# Patient Record
Sex: MALE | Race: WHITE | Employment: OTHER | URBAN - METROPOLITAN AREA
[De-identification: names, ages, dates, MRNs, and addresses within clinical notes are randomized per-mention and may not be internally consistent; named-entity substitution may affect disease eponyms.]

---

## 2023-03-08 DIAGNOSIS — E78.2 MIXED HYPERLIPIDEMIA: Primary | ICD-10-CM

## 2023-03-08 RX ORDER — ATORVASTATIN CALCIUM 80 MG/1
80 TABLET, FILM COATED ORAL DAILY
COMMUNITY
End: 2023-03-09 | Stop reason: SDUPTHER

## 2023-03-08 NOTE — TELEPHONE ENCOUNTER
Received refill request by fax from [facility] ([phone]) for Atorvastatin 80mg 1 tablet daily. No upcoming apt currently. Please schedule before refill can be sent.

## 2023-03-09 RX ORDER — ATORVASTATIN CALCIUM 80 MG/1
80 TABLET, FILM COATED ORAL DAILY
Qty: 90 TABLET | Refills: 0 | Status: SHIPPED | OUTPATIENT
Start: 2023-03-09 | End: 2023-03-30 | Stop reason: SDUPTHER

## 2023-03-15 PROBLEM — M17.0 DEGENERATIVE ARTHRITIS OF KNEE, BILATERAL: Status: ACTIVE | Noted: 2023-03-15

## 2023-03-15 PROBLEM — R73.03 PREDIABETES: Status: ACTIVE | Noted: 2023-03-15

## 2023-03-15 PROBLEM — R41.3 SHORT-TERM MEMORY LOSS: Status: ACTIVE | Noted: 2023-03-15

## 2023-03-15 PROBLEM — L21.9 SEBORRHEIC DERMATITIS: Status: ACTIVE | Noted: 2023-03-15

## 2023-03-15 PROBLEM — E78.5 HYPERLIPIDEMIA: Status: ACTIVE | Noted: 2023-03-15

## 2023-03-15 PROBLEM — N40.0 BPH (BENIGN PROSTATIC HYPERPLASIA): Status: ACTIVE | Noted: 2023-03-15

## 2023-03-15 PROBLEM — I10 BENIGN ESSENTIAL HYPERTENSION: Status: ACTIVE | Noted: 2023-03-15

## 2023-03-15 PROBLEM — H91.90 HEARING LOSS: Status: ACTIVE | Noted: 2023-03-15

## 2023-03-15 PROBLEM — I25.84 CALCIFICATION OF CORONARY ARTERY: Status: ACTIVE | Noted: 2023-03-15

## 2023-03-15 PROBLEM — I49.1 PREMATURE ATRIAL CONTRACTIONS: Status: ACTIVE | Noted: 2023-03-15

## 2023-03-15 PROBLEM — I25.10 CALCIFICATION OF CORONARY ARTERY: Status: ACTIVE | Noted: 2023-03-15

## 2023-03-15 RX ORDER — MULTIVITAMIN
TABLET ORAL
COMMUNITY

## 2023-03-15 RX ORDER — FINASTERIDE 5 MG/1
5 TABLET, FILM COATED ORAL DAILY
COMMUNITY
End: 2023-03-30 | Stop reason: SDUPTHER

## 2023-03-15 RX ORDER — BLOOD-GLUCOSE METER
KIT MISCELLANEOUS
COMMUNITY
Start: 2014-09-17

## 2023-03-15 RX ORDER — LISINOPRIL 10 MG/1
10 TABLET ORAL DAILY
COMMUNITY
End: 2023-03-30 | Stop reason: SDUPTHER

## 2023-03-15 RX ORDER — IBUPROFEN 200 MG
CAPSULE ORAL
COMMUNITY
Start: 2020-02-19

## 2023-03-15 RX ORDER — BLOOD-GLUCOSE METER
EACH MISCELLANEOUS
COMMUNITY
Start: 2019-08-09

## 2023-03-15 RX ORDER — ASPIRIN 81 MG/1
81 TABLET ORAL
COMMUNITY
End: 2023-03-30 | Stop reason: ALTCHOICE

## 2023-03-30 ENCOUNTER — LAB (OUTPATIENT)
Dept: LAB | Facility: LAB | Age: 83
End: 2023-03-30
Payer: MEDICARE

## 2023-03-30 ENCOUNTER — OFFICE VISIT (OUTPATIENT)
Dept: PRIMARY CARE | Facility: CLINIC | Age: 83
End: 2023-03-30
Payer: MEDICARE

## 2023-03-30 VITALS
RESPIRATION RATE: 14 BRPM | OXYGEN SATURATION: 95 % | HEIGHT: 68 IN | DIASTOLIC BLOOD PRESSURE: 69 MMHG | TEMPERATURE: 98.1 F | BODY MASS INDEX: 22.9 KG/M2 | HEART RATE: 60 BPM | WEIGHT: 151.1 LBS | SYSTOLIC BLOOD PRESSURE: 136 MMHG

## 2023-03-30 DIAGNOSIS — R73.03 PREDIABETES: ICD-10-CM

## 2023-03-30 DIAGNOSIS — I10 BENIGN ESSENTIAL HYPERTENSION: ICD-10-CM

## 2023-03-30 DIAGNOSIS — E78.2 MIXED HYPERLIPIDEMIA: ICD-10-CM

## 2023-03-30 DIAGNOSIS — I25.84 CALCIFICATION OF CORONARY ARTERY: ICD-10-CM

## 2023-03-30 DIAGNOSIS — Z00.00 ROUTINE GENERAL MEDICAL EXAMINATION AT HEALTH CARE FACILITY: Primary | ICD-10-CM

## 2023-03-30 DIAGNOSIS — Z13.89 SCREENING FOR MULTIPLE CONDITIONS: ICD-10-CM

## 2023-03-30 DIAGNOSIS — N40.0 BENIGN PROSTATIC HYPERPLASIA WITHOUT LOWER URINARY TRACT SYMPTOMS: ICD-10-CM

## 2023-03-30 DIAGNOSIS — I25.10 CALCIFICATION OF CORONARY ARTERY: ICD-10-CM

## 2023-03-30 PROBLEM — H35.3132 INTERMEDIATE STAGE NONEXUDATIVE AGE-RELATED MACULAR DEGENERATION OF BOTH EYES: Status: ACTIVE | Noted: 2023-03-30

## 2023-03-30 PROBLEM — H35.30 MACULAR DEGENERATION: Status: ACTIVE | Noted: 2023-03-30

## 2023-03-30 LAB
ALANINE AMINOTRANSFERASE (SGPT) (U/L) IN SER/PLAS: 20 U/L (ref 10–52)
ALBUMIN (G/DL) IN SER/PLAS: 4.4 G/DL (ref 3.4–5)
ALKALINE PHOSPHATASE (U/L) IN SER/PLAS: 87 U/L (ref 33–136)
ANION GAP IN SER/PLAS: 11 MMOL/L (ref 10–20)
ASPARTATE AMINOTRANSFERASE (SGOT) (U/L) IN SER/PLAS: 31 U/L (ref 9–39)
BASOPHILS (10*3/UL) IN BLOOD BY AUTOMATED COUNT: 0.03 X10E9/L (ref 0–0.1)
BASOPHILS/100 LEUKOCYTES IN BLOOD BY AUTOMATED COUNT: 0.5 % (ref 0–2)
BILIRUBIN TOTAL (MG/DL) IN SER/PLAS: 1.6 MG/DL (ref 0–1.2)
CALCIUM (MG/DL) IN SER/PLAS: 9.9 MG/DL (ref 8.6–10.6)
CARBON DIOXIDE, TOTAL (MMOL/L) IN SER/PLAS: 30 MMOL/L (ref 21–32)
CHLORIDE (MMOL/L) IN SER/PLAS: 103 MMOL/L (ref 98–107)
CHOLESTEROL (MG/DL) IN SER/PLAS: 127 MG/DL (ref 0–199)
CHOLESTEROL IN HDL (MG/DL) IN SER/PLAS: 60.6 MG/DL
CHOLESTEROL/HDL RATIO: 2.1
CREATININE (MG/DL) IN SER/PLAS: 0.84 MG/DL (ref 0.5–1.3)
EOSINOPHILS (10*3/UL) IN BLOOD BY AUTOMATED COUNT: 0.24 X10E9/L (ref 0–0.4)
EOSINOPHILS/100 LEUKOCYTES IN BLOOD BY AUTOMATED COUNT: 4 % (ref 0–6)
ERYTHROCYTE DISTRIBUTION WIDTH (RATIO) BY AUTOMATED COUNT: 12.7 % (ref 11.5–14.5)
ERYTHROCYTE MEAN CORPUSCULAR HEMOGLOBIN CONCENTRATION (G/DL) BY AUTOMATED: 32 G/DL (ref 32–36)
ERYTHROCYTE MEAN CORPUSCULAR VOLUME (FL) BY AUTOMATED COUNT: 96 FL (ref 80–100)
ERYTHROCYTES (10*6/UL) IN BLOOD BY AUTOMATED COUNT: 4.97 X10E12/L (ref 4.5–5.9)
ESTIMATED AVERAGE GLUCOSE FOR HBA1C: 126 MG/DL
GFR MALE: 86 ML/MIN/1.73M2
GLUCOSE (MG/DL) IN SER/PLAS: 120 MG/DL (ref 74–99)
HEMATOCRIT (%) IN BLOOD BY AUTOMATED COUNT: 47.8 % (ref 41–52)
HEMOGLOBIN (G/DL) IN BLOOD: 15.3 G/DL (ref 13.5–17.5)
HEMOGLOBIN A1C/HEMOGLOBIN TOTAL IN BLOOD: 6 %
IMMATURE GRANULOCYTES/100 LEUKOCYTES IN BLOOD BY AUTOMATED COUNT: 0.2 % (ref 0–0.9)
LDL: 56 MG/DL (ref 0–99)
LEUKOCYTES (10*3/UL) IN BLOOD BY AUTOMATED COUNT: 6 X10E9/L (ref 4.4–11.3)
LYMPHOCYTES (10*3/UL) IN BLOOD BY AUTOMATED COUNT: 1.46 X10E9/L (ref 0.8–3)
LYMPHOCYTES/100 LEUKOCYTES IN BLOOD BY AUTOMATED COUNT: 24.5 % (ref 13–44)
MONOCYTES (10*3/UL) IN BLOOD BY AUTOMATED COUNT: 0.6 X10E9/L (ref 0.05–0.8)
MONOCYTES/100 LEUKOCYTES IN BLOOD BY AUTOMATED COUNT: 10.1 % (ref 2–10)
NEUTROPHILS (10*3/UL) IN BLOOD BY AUTOMATED COUNT: 3.61 X10E9/L (ref 1.6–5.5)
NEUTROPHILS/100 LEUKOCYTES IN BLOOD BY AUTOMATED COUNT: 60.7 % (ref 40–80)
NRBC (PER 100 WBCS) BY AUTOMATED COUNT: 0 /100 WBC (ref 0–0)
PLATELETS (10*3/UL) IN BLOOD AUTOMATED COUNT: 196 X10E9/L (ref 150–450)
POTASSIUM (MMOL/L) IN SER/PLAS: 4.3 MMOL/L (ref 3.5–5.3)
PROTEIN TOTAL: 6.7 G/DL (ref 6.4–8.2)
SODIUM (MMOL/L) IN SER/PLAS: 140 MMOL/L (ref 136–145)
TRIGLYCERIDE (MG/DL) IN SER/PLAS: 50 MG/DL (ref 0–149)
UREA NITROGEN (MG/DL) IN SER/PLAS: 15 MG/DL (ref 6–23)
VLDL: 10 MG/DL (ref 0–40)

## 2023-03-30 PROCEDURE — 80053 COMPREHEN METABOLIC PANEL: CPT

## 2023-03-30 PROCEDURE — 36415 COLL VENOUS BLD VENIPUNCTURE: CPT

## 2023-03-30 PROCEDURE — 85025 COMPLETE CBC W/AUTO DIFF WBC: CPT

## 2023-03-30 PROCEDURE — 80061 LIPID PANEL: CPT

## 2023-03-30 PROCEDURE — G0444 DEPRESSION SCREEN ANNUAL: HCPCS | Performed by: FAMILY MEDICINE

## 2023-03-30 PROCEDURE — 83036 HEMOGLOBIN GLYCOSYLATED A1C: CPT

## 2023-03-30 PROCEDURE — 3075F SYST BP GE 130 - 139MM HG: CPT | Performed by: FAMILY MEDICINE

## 2023-03-30 PROCEDURE — 1160F RVW MEDS BY RX/DR IN RCRD: CPT | Performed by: FAMILY MEDICINE

## 2023-03-30 PROCEDURE — 3078F DIAST BP <80 MM HG: CPT | Performed by: FAMILY MEDICINE

## 2023-03-30 PROCEDURE — G0439 PPPS, SUBSEQ VISIT: HCPCS | Performed by: FAMILY MEDICINE

## 2023-03-30 PROCEDURE — 1159F MED LIST DOCD IN RCRD: CPT | Performed by: FAMILY MEDICINE

## 2023-03-30 PROCEDURE — 1170F FXNL STATUS ASSESSED: CPT | Performed by: FAMILY MEDICINE

## 2023-03-30 PROCEDURE — 1036F TOBACCO NON-USER: CPT | Performed by: FAMILY MEDICINE

## 2023-03-30 RX ORDER — LISINOPRIL 10 MG/1
10 TABLET ORAL DAILY
Qty: 90 TABLET | Refills: 3 | Status: SHIPPED | OUTPATIENT
Start: 2023-03-30 | End: 2024-05-02

## 2023-03-30 RX ORDER — FINASTERIDE 5 MG/1
5 TABLET, FILM COATED ORAL DAILY
Qty: 90 TABLET | Refills: 3 | Status: SHIPPED | OUTPATIENT
Start: 2023-03-30 | End: 2024-05-02

## 2023-03-30 RX ORDER — ATORVASTATIN CALCIUM 80 MG/1
80 TABLET, FILM COATED ORAL DAILY
Qty: 90 TABLET | Refills: 3 | Status: SHIPPED | OUTPATIENT
Start: 2023-03-30 | End: 2024-04-09 | Stop reason: SDUPTHER

## 2023-03-30 ASSESSMENT — ACTIVITIES OF DAILY LIVING (ADL)
DOING_HOUSEWORK: INDEPENDENT
TAKING_MEDICATION: INDEPENDENT
GROCERY_SHOPPING: INDEPENDENT
MANAGING_FINANCES: INDEPENDENT
BATHING: INDEPENDENT
DRESSING: INDEPENDENT

## 2023-03-30 ASSESSMENT — PATIENT HEALTH QUESTIONNAIRE - PHQ9
2. FEELING DOWN, DEPRESSED OR HOPELESS: NOT AT ALL
SUM OF ALL RESPONSES TO PHQ9 QUESTIONS 1 AND 2: 0
1. LITTLE INTEREST OR PLEASURE IN DOING THINGS: NOT AT ALL

## 2023-03-30 NOTE — ASSESSMENT & PLAN NOTE
Follows with Dr. Sinha. Considering stress test in the near future.  Continue management of cholesterol.

## 2023-03-30 NOTE — PROGRESS NOTES
Subjective   Reason for Visit: Austyn Allen is an 82 y.o. male here for a Medicare Wellness visit.      High blood pressure evaluation.     Review of symptoms:  Fatigue:  N   Headaches: N  Blurred vision :  N  Palpitations: N  Chest pains: N   Shortness of Breath: N  Swelling of legs: N  Blood in urine: N   Taking medications daily: Y  Medication side effects: N  Problems with medication compliance:N  Baby Aspirin 81 mg daily: Y     High cholesterol evaluation.     Supplements: Y   specific diet plan: N  exercising 30 min daily?: N     Fatigue:N  Chest pains:N  Shortness of Breath:N  Sudden Headaches: N  Vision loss: N   Syncope (fainting): N  Leg Claudication (limping/leg tiredness):  Taking medication daily: Y   Medication side effects:N        Reviewed all medications by prescribing practitioner or clinical pharmacist (such as prescriptions, OTCs, herbal therapies and supplements) and documented in the medical record.    HPI    Patient Care Team:  Chavo Contreras MD as PCP - General  Chavo Contreras MD as PCP - [employer] PCP     Review of Systems    Objective   Vitals:  There were no vitals taken for this visit.      Physical Exam    Assessment/Plan   Problem List Items Addressed This Visit          Other    Hyperlipidemia

## 2023-03-30 NOTE — PROGRESS NOTES
"Subjective   Reason for Visit: Austyn Allen is an 82 y.o. male here for a Medicare Wellness visit.          Reviewed all medications by prescribing practitioner or clinical pharmacist (such as prescriptions, OTCs, herbal therapies and supplements) and documented in the medical record.    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 He has not started any treatments.    Patient recently had cataract surgery with Dr. Pacheco. Reports significant improvement in vision s/p surgery. He still wears reading glasses.    BPH: Controlled well with finasteride. Wakes up 0-2 times per night to urinate. Follows with Dr. Wilkes urologist.    Patient follows with cardiology Dr. Sinha. He continues to monitor cholesterol. No current sign of  significant coronary artery blockage. Patient walks on treadmill regularly and tolerates it well.     Patient c/o difficulty hearing. He states he can hear sounds well but has difficulty understanding people's voices. He wears hearing aids with different settings.     Patient Care Team:  Chavo Contreras MD as PCP - General  Chavo Contreras MD as PCP - [employer] PCP     Review of Systems  constitutional: as per HPI  eyes:as per HPI  CV:as per HPI  Respiratory:as per HPI  GI:as per HPI  neuro:as per HPI  endo:as per HPI  heme/lymph:as per HPI     Objective   Vitals:  /69 (BP Location: Right arm, Patient Position: Sitting, BP Cuff Size: Adult)   Pulse 60   Temp 36.7 °C (98.1 °F) (Temporal)   Resp 14   Ht 1.727 m (5' 8\")   Wt 68.5 kg (151 lb 1.6 oz)   SpO2 95%   BMI 22.97 kg/m²       Physical Exam  Constitutional:       Appearance: Normal appearance. He is normal weight.   HENT:      Head: Normocephalic.      Right Ear: Tympanic membrane, ear canal and external ear normal.      Left Ear: Tympanic membrane, ear canal and external ear normal.      Nose: Nose normal.      Mouth/Throat:      Mouth: Mucous membranes are moist.   Eyes:      Conjunctiva/sclera: " Conjunctivae normal.      Pupils: Pupils are equal, round, and reactive to light.   Cardiovascular:      Rate and Rhythm: Normal rate and regular rhythm.   Pulmonary:      Effort: Pulmonary effort is normal.      Breath sounds: Normal breath sounds.   Abdominal:      General: Abdomen is flat. Bowel sounds are normal.      Palpations: Abdomen is soft.   Musculoskeletal:         General: Normal range of motion.      Cervical back: Neck supple.   Skin:     General: Skin is warm.   Neurological:      General: No focal deficit present.      Mental Status: He is alert and oriented to person, place, and time.   Psychiatric:         Mood and Affect: Mood normal.         Behavior: Behavior normal.         Thought Content: Thought content normal.         Judgment: Judgment normal.         Assessment/Plan   Problem List Items Addressed This Visit          Circulatory    Benign essential hypertension    Current Assessment & Plan     Stable  Blood pressure in office today was   BP Readings from Last 1 Encounters:   03/30/23 136/69   The goal range for blood pressure is below 130/80.   We will continue to monitor.   Continue lisinopril 10 mg daily         Relevant Medications    lisinopril 10 mg tablet    Other Relevant Orders    Comprehensive Metabolic Panel    CBC and Auto Differential    Calcification of coronary artery    Current Assessment & Plan     Follows with Dr. Sinha. Considering stress test in the near future.  Continue management of cholesterol.            Genitourinary    BPH (benign prostatic hyperplasia)    Overview     Monitored urology Dr Wilkes         Current Assessment & Plan     Saw Dr Wilkes 12/06/2022  Finasteride prescribed         Relevant Medications    finasteride (Proscar) 5 mg tablet       Endocrine/Metabolic    Prediabetes    Current Assessment & Plan     Ordered A1c.  Continue management with lifestyle measures.         Relevant Orders    Hemoglobin A1c    CBC and Auto Differential       Other     Mixed hyperlipidemia    Current Assessment & Plan     Stable.  Continue atorvastatin 80 mg daily.  Ordered lipid panel.         Relevant Medications    atorvastatin (Lipitor) 80 mg tablet    Other Relevant Orders    Lipid Panel     Other Visit Diagnoses       Routine general medical examination at health care facility    -  Primary    Relevant Orders    1 Year Follow Up In [facility] - PCP - Wellness Exam    Screening for multiple conditions                       By signing my name below SHY, jeff Hines, attest that this documentation has been prepared under the direction and in the presence of Dr. Chavo Contreras. 03/30/23

## 2023-03-30 NOTE — ASSESSMENT & PLAN NOTE
Stable  Blood pressure in office today was   BP Readings from Last 1 Encounters:   03/30/23 136/69     The goal range for blood pressure is below 130/80.   We will continue to monitor.   Continue lisinopril 10 mg daily

## 2023-04-04 DIAGNOSIS — E78.2 MIXED HYPERLIPIDEMIA: ICD-10-CM

## 2023-04-05 RX ORDER — ATORVASTATIN CALCIUM 80 MG/1
TABLET, FILM COATED ORAL
Qty: 90 TABLET | Refills: 3 | OUTPATIENT
Start: 2023-04-05

## 2023-06-07 ENCOUNTER — OFFICE VISIT (OUTPATIENT)
Dept: PRIMARY CARE | Facility: CLINIC | Age: 83
End: 2023-06-07
Payer: MEDICARE

## 2023-06-07 ENCOUNTER — TELEPHONE (OUTPATIENT)
Dept: PRIMARY CARE | Facility: CLINIC | Age: 83
End: 2023-06-07

## 2023-06-07 VITALS
TEMPERATURE: 98.6 F | OXYGEN SATURATION: 96 % | BODY MASS INDEX: 22.14 KG/M2 | WEIGHT: 145.6 LBS | DIASTOLIC BLOOD PRESSURE: 58 MMHG | SYSTOLIC BLOOD PRESSURE: 137 MMHG | HEART RATE: 94 BPM

## 2023-06-07 DIAGNOSIS — L03.114 CELLULITIS OF LEFT UPPER EXTREMITY: Primary | ICD-10-CM

## 2023-06-07 PROCEDURE — 3078F DIAST BP <80 MM HG: CPT | Performed by: FAMILY MEDICINE

## 2023-06-07 PROCEDURE — 1036F TOBACCO NON-USER: CPT | Performed by: FAMILY MEDICINE

## 2023-06-07 PROCEDURE — 3075F SYST BP GE 130 - 139MM HG: CPT | Performed by: FAMILY MEDICINE

## 2023-06-07 PROCEDURE — 1159F MED LIST DOCD IN RCRD: CPT | Performed by: FAMILY MEDICINE

## 2023-06-07 PROCEDURE — 99214 OFFICE O/P EST MOD 30 MIN: CPT | Performed by: FAMILY MEDICINE

## 2023-06-07 PROCEDURE — 1160F RVW MEDS BY RX/DR IN RCRD: CPT | Performed by: FAMILY MEDICINE

## 2023-06-07 RX ORDER — ACETAMINOPHEN 325 MG/1
TABLET ORAL EVERY 6 HOURS PRN
COMMUNITY

## 2023-06-07 RX ORDER — AMOXICILLIN AND CLAVULANATE POTASSIUM 875; 125 MG/1; MG/1
875 TABLET, FILM COATED ORAL 2 TIMES DAILY
Qty: 20 TABLET | Refills: 0 | Status: SHIPPED | OUTPATIENT
Start: 2023-06-07 | End: 2023-06-17

## 2023-06-07 NOTE — PROGRESS NOTES
" Subjective   Patient ID: Austyn Allen is a 82 y.o. male who presents for Hand Pain (Left hand/lower arm pain and redness started 3 days ago. Some of the pain has subsided over the last 24 hours, but the redness remains. ).  HPI    PCP - Dr. Contreras . Pt and spouse are [employer]     Here w spouse.   Pt reports redness and swelling in left hand fingers.    No fever, chills, injury,   No other illness  .     Not on blood thinners .  No recent other infx.    Took a dose of tylenol last night.  Today the redness is less \"bright \"  . The swellingon the top of his left hand is less.   Today is easier to make a fist.     Activity - skipped treadmill yesterday and today.   Mowed grass yesterday .    Review of Systems  TD  6 yrs ago   Objective   /58 (BP Location: Right arm, Patient Position: Sitting, BP Cuff Size: Adult)   Pulse 94   Temp 37 °C (98.6 °F) (Temporal)   Wt 66 kg (145 lb 9.6 oz)   SpO2 96%   BMI 22.14 kg/m²     Physical Exam  Alert .  Thin.  No acute distress.    Extr:  neurovasc intact bilat ue .     Erythema,  swelling, warmth from fingers ( PIP )  and proximally just below antecubital fossa   . Not tense. No axillary lymph nodes.  No supra or infraclav ln . Normal rom of elbow. Limited rom of wrist , thumb due to swelling.   Has dorsal scab ,base of the 5th metacarpal.  - states was a cat scratch     Has multiple linear light pink  marking  on left  upper chest and anterior shoulde.r not painful. No scabs, blisters,  or apparent open areas.    Assessment/Plan   Problem List Items Addressed This Visit    None  Visit Diagnoses       Cellulitis of left upper extremity    -  Primary    Relevant Medications    amoxicillin-pot clavulanate (Augmentin) 875-125 mg tablet             Cellulitis, cat scratch .   Oral antibx.  Elevate, rest .       Skin markings , upper chest- unclear etiology - he thinks he may have rubbed it on a few tree branches. No pain . I do not think this is related/ part of his " cellulitis.   CAITLYN Romo MD

## 2023-06-07 NOTE — TELEPHONE ENCOUNTER
Patient sent this message on spouses [employer] account:    Dr Contreras,  This is León Allen.  I have an issue now.  It is hard to reach you any other way     I have something like a compression injury on the top of my hand.  This has been the case for several days.  Can’ t reach you any other way.  The surface I is reddish pink and quite painful. This is moving up the inside of my arm.  Should you look at this?  Please respond  [phone].  Thanks,  León

## 2023-06-07 NOTE — TELEPHONE ENCOUNTER
Call pt ,  I can see him .  Use a spot today , that I gave you .   I can see him tomorrow or Friday, let me know if he can't come today .  Ill give you some time/ slot.

## 2024-04-03 ENCOUNTER — APPOINTMENT (OUTPATIENT)
Dept: PRIMARY CARE | Facility: CLINIC | Age: 84
End: 2024-04-03
Payer: MEDICARE

## 2024-04-09 ENCOUNTER — OFFICE VISIT (OUTPATIENT)
Dept: PRIMARY CARE | Facility: CLINIC | Age: 84
End: 2024-04-09
Payer: MEDICARE

## 2024-04-09 ENCOUNTER — LAB (OUTPATIENT)
Dept: LAB | Facility: LAB | Age: 84
End: 2024-04-09
Payer: MEDICARE

## 2024-04-09 VITALS
BODY MASS INDEX: 23.31 KG/M2 | WEIGHT: 153.3 LBS | DIASTOLIC BLOOD PRESSURE: 68 MMHG | HEART RATE: 59 BPM | OXYGEN SATURATION: 97 % | RESPIRATION RATE: 16 BRPM | TEMPERATURE: 96.8 F | SYSTOLIC BLOOD PRESSURE: 155 MMHG

## 2024-04-09 DIAGNOSIS — I10 BENIGN ESSENTIAL HYPERTENSION: ICD-10-CM

## 2024-04-09 DIAGNOSIS — R73.03 PREDIABETES: ICD-10-CM

## 2024-04-09 DIAGNOSIS — I25.10 CALCIFICATION OF CORONARY ARTERY: ICD-10-CM

## 2024-04-09 DIAGNOSIS — Z13.6 SCREENING FOR CARDIOVASCULAR CONDITION: ICD-10-CM

## 2024-04-09 DIAGNOSIS — E78.2 MIXED HYPERLIPIDEMIA: ICD-10-CM

## 2024-04-09 DIAGNOSIS — I25.84 CALCIFICATION OF CORONARY ARTERY: ICD-10-CM

## 2024-04-09 DIAGNOSIS — Z13.1 DIABETES MELLITUS SCREENING: ICD-10-CM

## 2024-04-09 DIAGNOSIS — Z00.00 ROUTINE GENERAL MEDICAL EXAMINATION AT HEALTH CARE FACILITY: ICD-10-CM

## 2024-04-09 DIAGNOSIS — Z13.89 SCREENING FOR MULTIPLE CONDITIONS: ICD-10-CM

## 2024-04-09 DIAGNOSIS — H91.93 BILATERAL HEARING LOSS, UNSPECIFIED HEARING LOSS TYPE: ICD-10-CM

## 2024-04-09 DIAGNOSIS — N40.1 BENIGN PROSTATIC HYPERPLASIA WITH LOWER URINARY TRACT SYMPTOMS, SYMPTOM DETAILS UNSPECIFIED: Primary | ICD-10-CM

## 2024-04-09 PROCEDURE — 1159F MED LIST DOCD IN RCRD: CPT | Performed by: FAMILY MEDICINE

## 2024-04-09 PROCEDURE — 99214 OFFICE O/P EST MOD 30 MIN: CPT | Performed by: FAMILY MEDICINE

## 2024-04-09 PROCEDURE — G0439 PPPS, SUBSEQ VISIT: HCPCS | Performed by: FAMILY MEDICINE

## 2024-04-09 PROCEDURE — 36415 COLL VENOUS BLD VENIPUNCTURE: CPT

## 2024-04-09 PROCEDURE — 80061 LIPID PANEL: CPT

## 2024-04-09 PROCEDURE — 3078F DIAST BP <80 MM HG: CPT | Performed by: FAMILY MEDICINE

## 2024-04-09 PROCEDURE — G0444 DEPRESSION SCREEN ANNUAL: HCPCS | Performed by: FAMILY MEDICINE

## 2024-04-09 PROCEDURE — 85025 COMPLETE CBC W/AUTO DIFF WBC: CPT

## 2024-04-09 PROCEDURE — 1160F RVW MEDS BY RX/DR IN RCRD: CPT | Performed by: FAMILY MEDICINE

## 2024-04-09 PROCEDURE — 1036F TOBACCO NON-USER: CPT | Performed by: FAMILY MEDICINE

## 2024-04-09 PROCEDURE — 1170F FXNL STATUS ASSESSED: CPT | Performed by: FAMILY MEDICINE

## 2024-04-09 PROCEDURE — 83036 HEMOGLOBIN GLYCOSYLATED A1C: CPT

## 2024-04-09 PROCEDURE — 3077F SYST BP >= 140 MM HG: CPT | Performed by: FAMILY MEDICINE

## 2024-04-09 PROCEDURE — 80053 COMPREHEN METABOLIC PANEL: CPT

## 2024-04-09 RX ORDER — ATORVASTATIN CALCIUM 40 MG/1
40 TABLET, FILM COATED ORAL DAILY
Qty: 90 TABLET | Refills: 3 | Status: SHIPPED | OUTPATIENT
Start: 2024-04-09

## 2024-04-09 ASSESSMENT — ACTIVITIES OF DAILY LIVING (ADL)
TAKING_MEDICATION: INDEPENDENT
DRESSING: INDEPENDENT
MANAGING_FINANCES: INDEPENDENT
BATHING: INDEPENDENT
DOING_HOUSEWORK: INDEPENDENT
GROCERY_SHOPPING: INDEPENDENT

## 2024-04-09 ASSESSMENT — ENCOUNTER SYMPTOMS
NAUSEA: 0
DIFFICULTY URINATING: 0
WHEEZING: 0
FREQUENCY: 0
PALPITATIONS: 0
DIARRHEA: 0
SHORTNESS OF BREATH: 0
CONSTIPATION: 0

## 2024-04-09 ASSESSMENT — PATIENT HEALTH QUESTIONNAIRE - PHQ9
SUM OF ALL RESPONSES TO PHQ9 QUESTIONS 1 AND 2: 0
2. FEELING DOWN, DEPRESSED OR HOPELESS: NOT AT ALL
1. LITTLE INTEREST OR PLEASURE IN DOING THINGS: NOT AT ALL

## 2024-04-09 NOTE — ASSESSMENT & PLAN NOTE
Blood pressure in office today was   BP Readings from Last 1 Encounters:   04/09/24 155/68   The goal range for blood pressure is below 130/80.   We will continue to monitor.   Continue lisinopril 10 mg daily

## 2024-04-09 NOTE — PROGRESS NOTES
Subjective   Reason for Visit: Austyn Allen is an 83 y.o. male here for a Medicare Wellness visit.               HPI    Patient Care Team:  Chavo Contreras MD as PCP - General  Chavo Contreras MD as PCP - [employer] PCP     Review of Systems    Objective   Vitals:  There were no vitals taken for this visit.      Physical Exam    Assessment/Plan   Problem List Items Addressed This Visit       Benign essential hypertension    BPH (benign prostatic hyperplasia) - Primary    Overview     Monitored urology Dr Wilkes         Calcification of coronary artery    Hearing loss    Overview     Has hearing aids         Mixed hyperlipidemia    Prediabetes

## 2024-04-09 NOTE — ASSESSMENT & PLAN NOTE
Lab Results   Component Value Date    HGBA1C 6.0 (A) 03/30/2023   Ordered A1c.  Continue management with lifestyle measures.

## 2024-04-09 NOTE — PROGRESS NOTES
Subjective   Reason for Visit: Austyn Allen is an 83 y.o. male here for a Medicare Wellness visit.       Reviewed all medications by prescribing practitioner or clinical pharmacist (such as prescriptions, OTCs, herbal therapies and supplements) and documented in the medical record.    He is only taking 40 mg of atorvastatin and he states that the pharmacy keeps filling for 80 mg. He does not want to have to cut the pills every day and would like a new prescription sent.   At home blood pressure readings seem to be stable, he states they are always around the goal of 130/80.   He is active and prefers to jog for exercise, mainly on a treadmill. He does not have any breathing symptoms associated with activity. His goal is to jog at least 3 miles per day.  He plans to see an eye doctor soon, it has been a few years. He does have a history of having cataracts removed.         Patient Care Team:  Chavo Contreras MD as PCP - General  Chavo Contreras MD as PCP - [employer] PCP     Review of Systems   Respiratory:  Negative for shortness of breath and wheezing.    Cardiovascular:  Negative for chest pain and palpitations.   Gastrointestinal:  Negative for constipation, diarrhea and nausea.   Genitourinary:  Negative for difficulty urinating, frequency and urgency.       Objective   Vitals:  /68 (BP Location: Left arm, Patient Position: Sitting, BP Cuff Size: Adult)   Pulse 59   Temp 36 °C (96.8 °F) (Temporal)   Resp 16   Wt 69.5 kg (153 lb 4.8 oz)   SpO2 97%   BMI 23.31 kg/m²       Physical Exam  Constitutional:       Appearance: Normal appearance.   HENT:      Head: Normocephalic.      Right Ear: Tympanic membrane normal.      Left Ear: Tympanic membrane normal.      Mouth/Throat:      Mouth: Mucous membranes are moist.      Pharynx: Oropharynx is clear.   Eyes:      Pupils: Pupils are equal, round, and reactive to light.   Cardiovascular:      Rate and Rhythm: Normal rate and regular rhythm.       Pulses: Normal pulses.      Heart sounds: Normal heart sounds. No murmur heard.  Pulmonary:      Effort: Pulmonary effort is normal.      Breath sounds: Normal breath sounds.   Abdominal:      General: Abdomen is flat. Bowel sounds are normal.      Palpations: Abdomen is soft.   Skin:     General: Skin is warm and dry.   Neurological:      General: No focal deficit present.      Mental Status: He is alert.   Psychiatric:         Mood and Affect: Mood normal.         Behavior: Behavior normal.         Thought Content: Thought content normal.         Judgment: Judgment normal.         Assessment/Plan   Problem List Items Addressed This Visit       Benign essential hypertension    Current Assessment & Plan     Blood pressure in office today was   BP Readings from Last 1 Encounters:   04/09/24 155/68   The goal range for blood pressure is below 130/80.   We will continue to monitor.   Continue lisinopril 10 mg daily         BPH (benign prostatic hyperplasia) - Primary    Overview     Monitored urology Dr Wilkes         Calcification of coronary artery    Overview     CAC 2021        LM 0,  .5,  LCx 37.37,  .33,     Total 840.28         Current Assessment & Plan     Follows with Dr. Sinha. Considering stress test in the near future.  Continue management of cholesterol.         Relevant Orders    CBC and Auto Differential    Hearing loss    Overview     Has hearing aids         Current Assessment & Plan     Bilateral hearing aids.         Mixed hyperlipidemia    Current Assessment & Plan     Lab Results   Component Value Date    CHOL 127 03/30/2023    CHOL 132 03/23/2022    CHOL 132 02/10/2021     Lab Results   Component Value Date    HDL 60.6 03/30/2023    HDL 53.9 03/23/2022    HDL 51.1 02/10/2021     Lab Results   Component Value Date    LDLF 56 03/30/2023     Lab Results   Component Value Date    TRIG 50 03/30/2023    TRIG 52 03/23/2022    TRIG 57 02/10/2021   Stable.  Patient only taking atorvastatin 40  mg daily, Rx is for 80 mg daily.  New Rx sent to reflect 40 mg dose.  Repeat lipid panel ordered.         Relevant Medications    atorvastatin (Lipitor) 40 mg tablet    Prediabetes    Current Assessment & Plan     Lab Results   Component Value Date    HGBA1C 6.0 (A) 03/30/2023   Ordered A1c.  Continue management with lifestyle measures.         Relevant Orders    Hemoglobin A1C    CBC and Auto Differential     Other Visit Diagnoses       Screening for multiple conditions        Diabetes mellitus screening        Relevant Orders    Comprehensive Metabolic Panel    Screening for cardiovascular condition        Relevant Orders    Lipid panel    Routine general medical examination at health care facility              Scribe Attestation  By signing my name below, IMelba Scribe   attest that this documentation has been prepared under the direction and in the presence of Chavo Contreras MD.

## 2024-04-09 NOTE — ASSESSMENT & PLAN NOTE
Lab Results   Component Value Date    CHOL 127 03/30/2023    CHOL 132 03/23/2022    CHOL 132 02/10/2021     Lab Results   Component Value Date    HDL 60.6 03/30/2023    HDL 53.9 03/23/2022    HDL 51.1 02/10/2021     Lab Results   Component Value Date    LDLF 56 03/30/2023     Lab Results   Component Value Date    TRIG 50 03/30/2023    TRIG 52 03/23/2022    TRIG 57 02/10/2021   Stable.  Patient only taking atorvastatin 40 mg daily, Rx is for 80 mg daily.  New Rx sent to reflect 40 mg dose.  Repeat lipid panel ordered.

## 2024-04-10 LAB
ALBUMIN SERPL BCP-MCNC: 4.2 G/DL (ref 3.4–5)
ALP SERPL-CCNC: 78 U/L (ref 33–136)
ALT SERPL W P-5'-P-CCNC: 19 U/L (ref 10–52)
ANION GAP SERPL CALC-SCNC: 10 MMOL/L (ref 10–20)
AST SERPL W P-5'-P-CCNC: 29 U/L (ref 9–39)
BASOPHILS # BLD AUTO: 0.02 X10*3/UL (ref 0–0.1)
BASOPHILS NFR BLD AUTO: 0.4 %
BILIRUB SERPL-MCNC: 1 MG/DL (ref 0–1.2)
BUN SERPL-MCNC: 19 MG/DL (ref 6–23)
CALCIUM SERPL-MCNC: 9.9 MG/DL (ref 8.6–10.6)
CHLORIDE SERPL-SCNC: 103 MMOL/L (ref 98–107)
CHOLEST SERPL-MCNC: 133 MG/DL (ref 0–199)
CHOLESTEROL/HDL RATIO: 2.1
CO2 SERPL-SCNC: 31 MMOL/L (ref 21–32)
CREAT SERPL-MCNC: 0.93 MG/DL (ref 0.5–1.3)
EGFRCR SERPLBLD CKD-EPI 2021: 81 ML/MIN/1.73M*2
EOSINOPHIL # BLD AUTO: 0.22 X10*3/UL (ref 0–0.4)
EOSINOPHIL NFR BLD AUTO: 4.1 %
ERYTHROCYTE [DISTWIDTH] IN BLOOD BY AUTOMATED COUNT: 12.8 % (ref 11.5–14.5)
EST. AVERAGE GLUCOSE BLD GHB EST-MCNC: 131 MG/DL
GLUCOSE SERPL-MCNC: 111 MG/DL (ref 74–99)
HBA1C MFR BLD: 6.2 %
HCT VFR BLD AUTO: 44.4 % (ref 41–52)
HDLC SERPL-MCNC: 62.7 MG/DL
HGB BLD-MCNC: 14.6 G/DL (ref 13.5–17.5)
IMM GRANULOCYTES # BLD AUTO: 0.01 X10*3/UL (ref 0–0.5)
IMM GRANULOCYTES NFR BLD AUTO: 0.2 % (ref 0–0.9)
LDLC SERPL CALC-MCNC: 61 MG/DL
LYMPHOCYTES # BLD AUTO: 1.41 X10*3/UL (ref 0.8–3)
LYMPHOCYTES NFR BLD AUTO: 26.4 %
MCH RBC QN AUTO: 30.4 PG (ref 26–34)
MCHC RBC AUTO-ENTMCNC: 32.9 G/DL (ref 32–36)
MCV RBC AUTO: 93 FL (ref 80–100)
MONOCYTES # BLD AUTO: 0.53 X10*3/UL (ref 0.05–0.8)
MONOCYTES NFR BLD AUTO: 9.9 %
NEUTROPHILS # BLD AUTO: 3.15 X10*3/UL (ref 1.6–5.5)
NEUTROPHILS NFR BLD AUTO: 59 %
NON HDL CHOLESTEROL: 70 MG/DL (ref 0–149)
NRBC BLD-RTO: 0 /100 WBCS (ref 0–0)
PLATELET # BLD AUTO: 194 X10*3/UL (ref 150–450)
POTASSIUM SERPL-SCNC: 4.4 MMOL/L (ref 3.5–5.3)
PROT SERPL-MCNC: 6.6 G/DL (ref 6.4–8.2)
RBC # BLD AUTO: 4.8 X10*6/UL (ref 4.5–5.9)
SODIUM SERPL-SCNC: 140 MMOL/L (ref 136–145)
TRIGL SERPL-MCNC: 45 MG/DL (ref 0–149)
VLDL: 9 MG/DL (ref 0–40)
WBC # BLD AUTO: 5.3 X10*3/UL (ref 4.4–11.3)

## 2024-05-01 DIAGNOSIS — N40.0 BENIGN PROSTATIC HYPERPLASIA WITHOUT LOWER URINARY TRACT SYMPTOMS: ICD-10-CM

## 2024-05-01 DIAGNOSIS — I10 BENIGN ESSENTIAL HYPERTENSION: ICD-10-CM

## 2024-05-02 RX ORDER — FINASTERIDE 5 MG/1
5 TABLET, FILM COATED ORAL DAILY
Qty: 90 TABLET | Refills: 3 | Status: SHIPPED | OUTPATIENT
Start: 2024-05-02

## 2024-05-02 RX ORDER — LISINOPRIL 10 MG/1
10 TABLET ORAL DAILY
Qty: 90 TABLET | Refills: 3 | Status: SHIPPED | OUTPATIENT
Start: 2024-05-02

## 2024-07-26 ENCOUNTER — HOSPITAL ENCOUNTER (EMERGENCY)
Age: 84
Discharge: HOME | End: 2024-07-26
Payer: MEDICARE

## 2024-07-26 VITALS — BODY MASS INDEX: 21.9 KG/M2

## 2024-07-26 VITALS
OXYGEN SATURATION: 98 % | HEART RATE: 66 BPM | RESPIRATION RATE: 18 BRPM | TEMPERATURE: 97.6 F | DIASTOLIC BLOOD PRESSURE: 61 MMHG | SYSTOLIC BLOOD PRESSURE: 153 MMHG

## 2024-07-26 VITALS
SYSTOLIC BLOOD PRESSURE: 185 MMHG | DIASTOLIC BLOOD PRESSURE: 62 MMHG | OXYGEN SATURATION: 98 % | TEMPERATURE: 97.7 F | HEART RATE: 67 BPM | RESPIRATION RATE: 16 BRPM
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
## 2024-07-26 DIAGNOSIS — S41.112A: ICD-10-CM

## 2024-07-26 DIAGNOSIS — S81.811A: Primary | ICD-10-CM

## 2024-07-26 DIAGNOSIS — S81.812A: ICD-10-CM

## 2024-07-26 PROCEDURE — 73590 X-RAY EXAM OF LOWER LEG: CPT

## 2024-07-26 PROCEDURE — 12002 RPR S/N/AX/GEN/TRNK2.6-7.5CM: CPT

## 2024-07-26 PROCEDURE — 70450 CT HEAD/BRAIN W/O DYE: CPT

## 2024-07-26 PROCEDURE — 99284 EMERGENCY DEPT VISIT MOD MDM: CPT

## 2024-07-26 PROCEDURE — 90715 TDAP VACCINE 7 YRS/> IM: CPT

## 2024-07-28 ENCOUNTER — TELEPHONE (OUTPATIENT)
Dept: PRIMARY CARE | Facility: CLINIC | Age: 84
End: 2024-07-28
Payer: MEDICARE
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
## 2024-08-01 ENCOUNTER — OFFICE VISIT (OUTPATIENT)
Dept: PRIMARY CARE | Facility: CLINIC | Age: 84
End: 2024-08-01
Payer: MEDICARE

## 2024-08-01 VITALS
WEIGHT: 144.5 LBS | OXYGEN SATURATION: 96 % | TEMPERATURE: 97 F | SYSTOLIC BLOOD PRESSURE: 166 MMHG | DIASTOLIC BLOOD PRESSURE: 71 MMHG | BODY MASS INDEX: 21.97 KG/M2 | HEART RATE: 60 BPM

## 2024-08-01 DIAGNOSIS — R41.3 SHORT-TERM MEMORY LOSS: Primary | ICD-10-CM
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
## 2024-08-01 DIAGNOSIS — S06.0X0A CONCUSSION WITHOUT LOSS OF CONSCIOUSNESS, INITIAL ENCOUNTER: ICD-10-CM

## 2024-08-01 PROCEDURE — 1159F MED LIST DOCD IN RCRD: CPT | Performed by: FAMILY MEDICINE

## 2024-08-01 PROCEDURE — 3077F SYST BP >= 140 MM HG: CPT | Performed by: FAMILY MEDICINE

## 2024-08-01 PROCEDURE — 1160F RVW MEDS BY RX/DR IN RCRD: CPT | Performed by: FAMILY MEDICINE

## 2024-08-01 PROCEDURE — 99214 OFFICE O/P EST MOD 30 MIN: CPT | Performed by: FAMILY MEDICINE

## 2024-08-01 PROCEDURE — 3078F DIAST BP <80 MM HG: CPT | Performed by: FAMILY MEDICINE

## 2024-08-01 ASSESSMENT — ENCOUNTER SYMPTOMS
HEADACHES: 0
WOUND: 1
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
## 2024-08-01 NOTE — PATIENT INSTRUCTIONS
Mayra Vargas MD  Geriatric Medicine  [facility]  [address]  [facility] [id number] [phone]      Bre Reyes MD  Geriatric Medicine  [address]  [facility] [address] [phone]

## 2024-08-06 NOTE — ASSESSMENT & PLAN NOTE
Family reports increased repeated stories  He claims lifelong issues with memory  Concern may be demonstrating signs of early dementia  Refer to geriatric med for evaluation ([facility], [facility])    Mayra Vargas MD  Geriatric Medicine  [facility]  [address] [facility] [address] [phone]      Bre Reyes MD  Geriatric Medicine  [address]  [facility] [address] [phone]

## 2024-09-05 ENCOUNTER — TELEPHONE (OUTPATIENT)
Dept: PRIMARY CARE | Facility: CLINIC | Age: 84
End: 2024-09-05
Payer: MEDICARE

## 2024-09-05 ENCOUNTER — PATIENT OUTREACH (OUTPATIENT)
Dept: PRIMARY CARE | Facility: CLINIC | Age: 84
End: 2024-09-05
Payer: MEDICARE

## 2024-09-05 RX ORDER — LACOSAMIDE 100 MG/1
100 TABLET ORAL 2 TIMES DAILY
COMMUNITY

## 2024-09-05 RX ORDER — SENNOSIDES 8.6 MG/1
1 TABLET ORAL 2 TIMES DAILY
COMMUNITY

## 2024-09-05 RX ORDER — OXYCODONE HYDROCHLORIDE 5 MG/1
5 CAPSULE ORAL EVERY 6 HOURS PRN
COMMUNITY

## 2024-09-05 NOTE — PROGRESS NOTES
Discharge Facility:[facility]  Discharge Diagnosis:  Subdural hematoma   Post-op pain   Seizure     Admission Date:8/31/2024  Discharge Date: 9/4/2024    PCP Appointment Date:9/12/2024  Specialist Appointment Date:   9/19/2024 Neurosurgeon/Howie  10/4/2024 Neurology/Ernestine  10/23/2024 [facility] Encounter and Summary Linked: Yes  See discharge assessment below for further details  Engagement  Call Start Time: 1545 (9/5/2024  6:04 PM)    Medications  Medications reviewed with patient/caregiver?: Yes (9/5/2024  6:04 PM)  Is the patient having any side effects they believe may be caused by any medication additions or changes?: No (9/5/2024  6:04 PM)  Does the patient have all medications ordered at discharge?: Yes (9/5/2024  6:04 PM)  Care Management Interventions: No intervention needed (9/5/2024  6:04 PM)  Prescription Comments: -- (Vimpat 100 mg one bid, Oxycodone 5 mg IR one q 6 hours prn, Senokot 8.6 one bid) (9/5/2024  6:04 PM)  Is the patient taking all medications as directed (includes completed medication regime)?: Yes (9/5/2024  6:04 PM)    Appointments  Does the patient have a primary care provider?: Yes (9/5/2024  6:04 PM)  Care Management Interventions: Verified appointment date/time/provider (9/12/2024) (9/5/2024  6:04 PM)  Has the patient kept scheduled appointments due by today?: Yes (9/5/2024  6:04 PM)    Self Management  What is the home health agency?: -- (N/A) (9/5/2024  6:04 PM)  What Durable Medical Equipment (DME) was ordered?: -- (N/A) (9/5/2024  6:04 PM)    Patient Teaching  Does the patient have access to their discharge instructions?: Yes (9/5/2024  6:04 PM)  What is the patient's perception of their health status since discharge?: Improving (9/5/2024  6:04 PM)  Is the patient/caregiver able to teach back the hierarchy of who to call/visit for symptoms/problems? PCP, Specialist, Home Health nurse, Urgent Care, ED, 911: Yes (9/5/2024  6:04 PM)    Wrap Up  Wrap Up Additional  Comments: -- (Spoke to daughter Meeta. She states that Austyn is doing very well since returning home. He is using Tylenol.  Was able to get good rest. No further pain. Family is helping with care. Has his follow up appointments scheduled.) (9/5/2024  6:04 PM)

## 2024-09-05 NOTE — TELEPHONE ENCOUNTER
Carlos Claros  Austyn Allen is my Father in Law.  He was discharged yesterday from [facility] as you are already aware.  I would recommend that you contact Veronique Horta, Austyn's daughter.  [phone].  She is with him currently staying at his house.  It would be great if Austyn could get in to see Dr. Cotnreras next week if possible.  Thank you for all you do, Jaylon

## 2024-09-05 NOTE — TELEPHONE ENCOUNTER
----- Message from Hyacinth Mata sent at 9/5/2024  3:34 PM EDT -----  Regarding: TCM Needs appt Dr. Horta would like seen next week if possible  This patient [facility] discharged from:[facility]    Discharge diagnosis:  Subdural Hematoma  Post-op pain  Seizure       Date of discharge:9/4/2024           No PCP appointments available within 14 days of discharge  Please reach out to patient and schedule an appointment by: 9/17/2024    #Dr Horta would like him to be seen next week if possible.     Thank You!

## 2024-09-10 ENCOUNTER — APPOINTMENT (OUTPATIENT)
Dept: PRIMARY CARE | Facility: CLINIC | Age: 84
End: 2024-09-10
Payer: MEDICARE

## 2024-09-12 ENCOUNTER — APPOINTMENT (OUTPATIENT)
Dept: PRIMARY CARE | Facility: CLINIC | Age: 84
End: 2024-09-12
Payer: MEDICARE

## 2024-09-12 VITALS
RESPIRATION RATE: 14 BRPM | TEMPERATURE: 98.6 F | DIASTOLIC BLOOD PRESSURE: 68 MMHG | OXYGEN SATURATION: 96 % | SYSTOLIC BLOOD PRESSURE: 149 MMHG | BODY MASS INDEX: 21.26 KG/M2 | WEIGHT: 139.8 LBS | HEART RATE: 72 BPM

## 2024-09-12 DIAGNOSIS — S06.5XAA SUBDURAL HEMATOMA (MULTI): ICD-10-CM

## 2024-09-12 DIAGNOSIS — I10 BENIGN ESSENTIAL HYPERTENSION: Primary | ICD-10-CM

## 2024-09-12 DIAGNOSIS — R41.3 SHORT-TERM MEMORY LOSS: ICD-10-CM

## 2024-09-12 PROCEDURE — 3078F DIAST BP <80 MM HG: CPT | Performed by: FAMILY MEDICINE

## 2024-09-12 PROCEDURE — G0008 ADMIN INFLUENZA VIRUS VAC: HCPCS | Performed by: FAMILY MEDICINE

## 2024-09-12 PROCEDURE — 90662 IIV NO PRSV INCREASED AG IM: CPT | Performed by: FAMILY MEDICINE

## 2024-09-12 PROCEDURE — 1160F RVW MEDS BY RX/DR IN RCRD: CPT | Performed by: FAMILY MEDICINE

## 2024-09-12 PROCEDURE — 1159F MED LIST DOCD IN RCRD: CPT | Performed by: FAMILY MEDICINE

## 2024-09-12 PROCEDURE — 99214 OFFICE O/P EST MOD 30 MIN: CPT | Performed by: FAMILY MEDICINE

## 2024-09-12 PROCEDURE — 3077F SYST BP >= 140 MM HG: CPT | Performed by: FAMILY MEDICINE

## 2024-09-12 ASSESSMENT — ANXIETY QUESTIONNAIRES
3. WORRYING TOO MUCH ABOUT DIFFERENT THINGS: NOT AT ALL
6. BECOMING EASILY ANNOYED OR IRRITABLE: NOT AT ALL
7. FEELING AFRAID AS IF SOMETHING AWFUL MIGHT HAPPEN: NOT AT ALL
5. BEING SO RESTLESS THAT IT IS HARD TO SIT STILL: NOT AT ALL
4. TROUBLE RELAXING: NOT AT ALL
1. FEELING NERVOUS, ANXIOUS, OR ON EDGE: NOT AT ALL
GAD7 TOTAL SCORE: 0
2. NOT BEING ABLE TO STOP OR CONTROL WORRYING: NOT AT ALL

## 2024-09-12 ASSESSMENT — PATIENT HEALTH QUESTIONNAIRE - PHQ9
6. FEELING BAD ABOUT YOURSELF - OR THAT YOU ARE A FAILURE OR HAVE LET YOURSELF OR YOUR FAMILY DOWN: NOT AT ALL
7. TROUBLE CONCENTRATING ON THINGS, SUCH AS READING THE NEWSPAPER OR WATCHING TELEVISION: NOT AT ALL
SUM OF ALL RESPONSES TO PHQ QUESTIONS 1-9: 0
3. TROUBLE FALLING OR STAYING ASLEEP OR SLEEPING TOO MUCH: NOT AT ALL
SUM OF ALL RESPONSES TO PHQ9 QUESTIONS 1 AND 2: 0
8. MOVING OR SPEAKING SO SLOWLY THAT OTHER PEOPLE COULD HAVE NOTICED. OR THE OPPOSITE, BEING SO FIGETY OR RESTLESS THAT YOU HAVE BEEN MOVING AROUND A LOT MORE THAN USUAL: NOT AT ALL
4. FEELING TIRED OR HAVING LITTLE ENERGY: NOT AT ALL
1. LITTLE INTEREST OR PLEASURE IN DOING THINGS: NOT AT ALL
5. POOR APPETITE OR OVEREATING: NOT AT ALL
2. FEELING DOWN, DEPRESSED OR HOPELESS: NOT AT ALL
9. THOUGHTS THAT YOU WOULD BE BETTER OFF DEAD, OR OF HURTING YOURSELF: NOT AT ALL

## 2024-09-12 ASSESSMENT — ENCOUNTER SYMPTOMS
AGITATION: 1
HEADACHES: 0
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
## 2024-09-18 DIAGNOSIS — F43.9 STRESS: ICD-10-CM

## 2024-09-18 DIAGNOSIS — R56.9 SEIZURE (MULTI): Primary | ICD-10-CM

## 2024-09-18 RX ORDER — LACOSAMIDE 100 MG/1
100 TABLET ORAL 2 TIMES DAILY
Qty: 60 TABLET | Refills: 0 | Status: SHIPPED | OUTPATIENT
Start: 2024-09-18

## 2024-09-18 RX ORDER — SERTRALINE HYDROCHLORIDE 25 MG/1
25 TABLET, FILM COATED ORAL DAILY
Qty: 30 TABLET | Refills: 1 | Status: SHIPPED | OUTPATIENT
Start: 2024-09-18 | End: 2024-11-17

## 2024-09-25 PROBLEM — S06.0X0A CONCUSSION WITHOUT LOSS OF CONSCIOUSNESS: Status: RESOLVED | Noted: 2024-08-01 | Resolved: 2024-09-25
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
## 2024-09-25 PROBLEM — S06.5XAA SUBDURAL HEMATOMA (MULTI): Status: ACTIVE | Noted: 2024-08-31
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
## 2024-09-25 NOTE — ASSESSMENT & PLAN NOTE
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

## 2024-09-25 NOTE — ASSESSMENT & PLAN NOTE
Blood pressure in office today was   BP Readings from Last 1 Encounters:   09/12/24 149/68   The goal range for blood pressure is below 130/80.   We will continue to monitor.   Continue lisinopril 10 mg daily

## 2024-10-28 DIAGNOSIS — F43.9 STRESS: ICD-10-CM

## 2024-10-28 RX ORDER — SERTRALINE HYDROCHLORIDE 25 MG/1
25 TABLET, FILM COATED ORAL DAILY
Qty: 30 TABLET | Refills: 1 | Status: SHIPPED | OUTPATIENT
Start: 2024-10-28 | End: 2024-12-27

## 2024-10-30 ENCOUNTER — PATIENT OUTREACH (OUTPATIENT)
Dept: PRIMARY CARE | Facility: CLINIC | Age: 84
End: 2024-10-30
Payer: MEDICARE

## 2024-12-02 ENCOUNTER — PATIENT OUTREACH (OUTPATIENT)
Dept: PRIMARY CARE | Facility: CLINIC | Age: 84
End: 2024-12-02
Payer: MEDICARE

## 2024-12-04 ENCOUNTER — APPOINTMENT (OUTPATIENT)
Dept: PRIMARY CARE | Facility: CLINIC | Age: 84
End: 2024-12-04
Payer: MEDICARE

## 2024-12-04 VITALS
WEIGHT: 150.7 LBS | DIASTOLIC BLOOD PRESSURE: 61 MMHG | SYSTOLIC BLOOD PRESSURE: 134 MMHG | RESPIRATION RATE: 14 BRPM | HEART RATE: 62 BPM | BODY MASS INDEX: 22.91 KG/M2 | OXYGEN SATURATION: 96 % | TEMPERATURE: 97.5 F

## 2024-12-04 DIAGNOSIS — G31.84 MILD COGNITIVE IMPAIRMENT WITH MEMORY LOSS: ICD-10-CM

## 2024-12-04 DIAGNOSIS — S06.5XAA SUBDURAL HEMATOMA (MULTI): Primary | ICD-10-CM

## 2024-12-04 PROBLEM — G40.909 SEIZURE DISORDER (MULTI): Status: ACTIVE | Noted: 2024-12-04

## 2024-12-04 PROCEDURE — 99214 OFFICE O/P EST MOD 30 MIN: CPT | Performed by: FAMILY MEDICINE

## 2024-12-04 PROCEDURE — 3075F SYST BP GE 130 - 139MM HG: CPT | Performed by: FAMILY MEDICINE

## 2024-12-04 PROCEDURE — 1036F TOBACCO NON-USER: CPT | Performed by: FAMILY MEDICINE

## 2024-12-04 PROCEDURE — 3078F DIAST BP <80 MM HG: CPT | Performed by: FAMILY MEDICINE

## 2024-12-04 PROCEDURE — 1159F MED LIST DOCD IN RCRD: CPT | Performed by: FAMILY MEDICINE

## 2024-12-04 PROCEDURE — 1160F RVW MEDS BY RX/DR IN RCRD: CPT | Performed by: FAMILY MEDICINE

## 2024-12-04 RX ORDER — DONEPEZIL HYDROCHLORIDE 5 MG/1
TABLET, FILM COATED ORAL
COMMUNITY
Start: 2024-11-20

## 2024-12-04 RX ORDER — LACOSAMIDE 150 MG/1
TABLET ORAL
COMMUNITY
Start: 2024-12-03

## 2024-12-04 RX ORDER — DONEPEZIL HYDROCHLORIDE 10 MG/1
10 TABLET, FILM COATED ORAL DAILY
COMMUNITY
Start: 2024-11-20

## 2024-12-04 ASSESSMENT — ENCOUNTER SYMPTOMS
RESPIRATORY NEGATIVE: 1
NEUROLOGICAL NEGATIVE: 1
GASTROINTESTINAL NEGATIVE: 1
PSYCHIATRIC NEGATIVE: 1
CARDIOVASCULAR NEGATIVE: 1
MUSCULOSKELETAL NEGATIVE: 1

## 2024-12-04 NOTE — PROGRESS NOTES
Subjective   Patient ID: Austyn Allen is a 84 y.o. male who presents for Follow-up (1 mo fuv).    He reports feeling well overall. He saw his neurologist in October following a CT that looked great post craniotomy. Dr. Denise stated there was no need for follow up imaging. He has another follow up scheduled 3/14/2025. He would like to start jogging again but his physicians have told him that a fast walk is much safer.   He was placed on donepezil 5 mg once daily by Dr. Vargas, geriatric med, on 11/20. He will continue this dose until hits one full month and then they will increase it to 10 mg daily.       Review of Systems   HENT: Negative.     Respiratory: Negative.     Cardiovascular: Negative.    Gastrointestinal: Negative.    Genitourinary: Negative.    Musculoskeletal: Negative.    Neurological: Negative.    Psychiatric/Behavioral: Negative.         Objective   /61 (BP Location: Left arm, Patient Position: Sitting, BP Cuff Size: Adult)   Pulse 62   Temp 36.4 °C (97.5 °F) (Temporal)   Resp 14   Wt 68.4 kg (150 lb 11.2 oz)   SpO2 96%   BMI 22.91 kg/m²     Physical Exam  Constitutional:       Appearance: Normal appearance. He is normal weight.   HENT:      Head: Normocephalic.   Eyes:      Conjunctiva/sclera: Conjunctivae normal.   Musculoskeletal:      Cervical back: Normal range of motion.   Neurological:      General: No focal deficit present.      Mental Status: He is alert.   Psychiatric:         Mood and Affect: Mood normal.         Behavior: Behavior normal.         Thought Content: Thought content normal.         Judgment: Judgment normal.       Assessment/Plan   Problem List Items Addressed This Visit       Mild cognitive impairment with memory loss     Dr. Vargas 11/20/24 - started on donepezil 5 mg.   Will increase to 10 mg daily after 1 full month on starter dose.  Tolerating medication          Subdural hematoma (Multi) - Primary     Recent CT 10/17/24 - normal for post op craniotomy.  Per  Dr. Denise - no further imaging required.  Will continue to be monitored by neurology.         He wants to resume jogging  - I approved doing so on treadmill      Follow up: Scheduled 4/10/2025    Scribe Attestation  By signing my name below, I, Melba Betancur , Scribe   attest that this documentation has been prepared under the direction and in the presence of Chavo Contreras MD.

## 2024-12-04 NOTE — ASSESSMENT & PLAN NOTE
Dr. Vargas 11/20/24 - started on donepezil 5 mg.   Will increase to 10 mg daily after 1 full month on starter dose.  Tolerating medication
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
## 2024-12-04 NOTE — ASSESSMENT & PLAN NOTE
Recent CT 10/17/24 - normal for post op craniotomy.  Per Dr. Denise - no further imaging required.  Will continue to be monitored by neurology.

## 2025-01-14 ENCOUNTER — PATIENT MESSAGE (OUTPATIENT)
Dept: PRIMARY CARE | Facility: CLINIC | Age: 85
End: 2025-01-14
Payer: MEDICARE

## 2025-01-14 DIAGNOSIS — E78.2 MIXED HYPERLIPIDEMIA: ICD-10-CM

## 2025-01-14 DIAGNOSIS — N40.0 BENIGN PROSTATIC HYPERPLASIA WITHOUT LOWER URINARY TRACT SYMPTOMS: ICD-10-CM

## 2025-01-14 DIAGNOSIS — I10 BENIGN ESSENTIAL HYPERTENSION: ICD-10-CM

## 2025-01-14 RX ORDER — ATORVASTATIN CALCIUM 40 MG/1
40 TABLET, FILM COATED ORAL DAILY
Qty: 90 TABLET | Refills: 3 | Status: SHIPPED | OUTPATIENT
Start: 2025-01-14

## 2025-01-14 RX ORDER — FINASTERIDE 5 MG/1
5 TABLET, FILM COATED ORAL DAILY
Qty: 90 TABLET | Refills: 3 | Status: SHIPPED | OUTPATIENT
Start: 2025-01-14

## 2025-01-14 RX ORDER — LISINOPRIL 10 MG/1
10 TABLET ORAL DAILY
Qty: 90 TABLET | Refills: 3 | Status: SHIPPED | OUTPATIENT
Start: 2025-01-14

## 2025-04-10 ENCOUNTER — APPOINTMENT (OUTPATIENT)
Dept: PRIMARY CARE | Facility: CLINIC | Age: 85
End: 2025-04-10
Payer: MEDICARE

## 2025-05-09 NOTE — PROGRESS NOTES
"Subjective   Reason for Visit: Austyn Allen is an 84 y.o. male here for a Medicare Wellness visit.          Reviewed all medications by prescribing practitioner or clinical pharmacist (such as prescriptions, OTCs, herbal therapies and supplements) and documented in the medical record.    HPI  With daughter Veronique today.     Doing well. Eating well.    Seeing Neurologist Dr. Parada for his seizures. Stable, taking Lacosamide 150 mg. Following up with him every 6 months. No new concerns. He denies any intolerance to the lacosamide.  He denies any recurrent seizure-like events or transient numbness.     On donepezil 10 mg  once daily by Dr. Vargas, geriatric med. Stable no complaints..   He does not feel that he becomes irritable or angry easier. He does wish that he could get outside and walk more.His daughter has noticed some occurrences where he becomes irritable easily      He has an appointment with Dr. Parada 9/17 and Dr. Vargas 8/25.      Patient Care Team:  Chavo Contreras MD as PCP - General  Chavo Contreras MD as PCP - [facility] PCP     Review of Systems   HENT: Negative.     Respiratory: Negative.     Cardiovascular: Negative.    Gastrointestinal: Negative.    Genitourinary: Negative.    Musculoskeletal: Negative.    Neurological: Negative.    Psychiatric/Behavioral: Negative.         Objective   Vitals:  /77 (BP Location: Right arm, Patient Position: Sitting, BP Cuff Size: Adult)   Pulse 57   Temp 36.7 °C (98.1 °F) (Temporal)   Resp 14   Ht 1.727 m (5' 8\")   Wt 69.2 kg (152 lb 8 oz)   SpO2 96%   BMI 23.19 kg/m²       Physical Exam  Constitutional:       Appearance: Normal appearance. He is normal weight.   HENT:      Head: Normocephalic.   Eyes:      Conjunctiva/sclera: Conjunctivae normal.   Musculoskeletal:      Cervical back: Normal range of motion.   Neurological:      General: No focal deficit present.      Mental Status: He is alert.   Psychiatric:         Mood and Affect: " Mood normal.         Behavior: Behavior normal.         Thought Content: Thought content normal.         Judgment: Judgment normal.         Assessment/Plan   Problem List Items Addressed This Visit       Benign essential hypertension    Blood pressure in office today was   BP Readings from Last 1 Encounters:   05/14/25 162/77   The goal range for blood pressure is below 130/80.   We will continue to monitor.   Continue lisinopril 10 mg daily         Prediabetes    Relevant Orders    TSH with reflex to Free T4 if abnormal    Hemoglobin A1C    Seizure disorder (Multi)    Relevant Orders    TSH with reflex to Free T4 if abnormal     Other Visit Diagnoses         Routine general medical examination at health care facility    -  Primary    Relevant Orders    1 Year Follow Up In [facility] - PCP - Wellness Exam    Comprehensive Metabolic Panel      Screening for hyperlipidemia        Relevant Orders    Lipid Panel      Anemia, unspecified type        Relevant Orders    CBC and Auto Differential    TSH with reflex to Free T4 if abnormal          Mood Changes: Start Sertraline in low dose.     Follow up: Scheduled 6 months.     Scribe Attestation  By signing my name below, IMelba Scribe   attest that this documentation has been prepared under the direction and in the presence of Chavo Contreras MD.    Scribe Attestation  By signing my name below, IJossy Scribe attest that this documentation has been prepared under the direction and in the presence of Chavo Contreras MD. All medical record entries made by the Scribe were at my direction or personally dictated by me. I have reviewed the chart and agree that the record accurately reflects my personal performance of the history, physical exam, discussion and plan.

## 2025-05-14 ENCOUNTER — APPOINTMENT (OUTPATIENT)
Dept: PRIMARY CARE | Facility: CLINIC | Age: 85
End: 2025-05-14
Payer: MEDICARE

## 2025-05-14 VITALS
OXYGEN SATURATION: 96 % | BODY MASS INDEX: 23.11 KG/M2 | HEIGHT: 68 IN | SYSTOLIC BLOOD PRESSURE: 162 MMHG | RESPIRATION RATE: 14 BRPM | HEART RATE: 57 BPM | TEMPERATURE: 98.1 F | WEIGHT: 152.5 LBS | DIASTOLIC BLOOD PRESSURE: 77 MMHG

## 2025-05-14 DIAGNOSIS — I25.10 CALCIFICATION OF CORONARY ARTERY: ICD-10-CM

## 2025-05-14 DIAGNOSIS — I10 BENIGN ESSENTIAL HYPERTENSION: ICD-10-CM

## 2025-05-14 DIAGNOSIS — E78.2 MIXED HYPERLIPIDEMIA: ICD-10-CM

## 2025-05-14 DIAGNOSIS — Z13.220 SCREENING FOR HYPERLIPIDEMIA: ICD-10-CM

## 2025-05-14 DIAGNOSIS — Z00.00 ROUTINE GENERAL MEDICAL EXAMINATION AT HEALTH CARE FACILITY: Primary | ICD-10-CM

## 2025-05-14 DIAGNOSIS — D64.9 ANEMIA, UNSPECIFIED TYPE: ICD-10-CM

## 2025-05-14 DIAGNOSIS — G31.84 MILD COGNITIVE IMPAIRMENT WITH MEMORY LOSS: ICD-10-CM

## 2025-05-14 DIAGNOSIS — R73.03 PREDIABETES: ICD-10-CM

## 2025-05-14 DIAGNOSIS — G40.909 SEIZURE DISORDER (MULTI): ICD-10-CM

## 2025-05-14 PROCEDURE — 1160F RVW MEDS BY RX/DR IN RCRD: CPT | Performed by: FAMILY MEDICINE

## 2025-05-14 PROCEDURE — G0439 PPPS, SUBSEQ VISIT: HCPCS | Performed by: FAMILY MEDICINE

## 2025-05-14 PROCEDURE — 1036F TOBACCO NON-USER: CPT | Performed by: FAMILY MEDICINE

## 2025-05-14 PROCEDURE — 1123F ACP DISCUSS/DSCN MKR DOCD: CPT | Performed by: FAMILY MEDICINE

## 2025-05-14 PROCEDURE — 3077F SYST BP >= 140 MM HG: CPT | Performed by: FAMILY MEDICINE

## 2025-05-14 PROCEDURE — 1170F FXNL STATUS ASSESSED: CPT | Performed by: FAMILY MEDICINE

## 2025-05-14 PROCEDURE — 1159F MED LIST DOCD IN RCRD: CPT | Performed by: FAMILY MEDICINE

## 2025-05-14 PROCEDURE — 3078F DIAST BP <80 MM HG: CPT | Performed by: FAMILY MEDICINE

## 2025-05-14 RX ORDER — SERTRALINE HYDROCHLORIDE 25 MG/1
25 TABLET, FILM COATED ORAL DAILY
Qty: 90 TABLET | Refills: 1 | Status: SHIPPED | OUTPATIENT
Start: 2025-05-14

## 2025-05-14 ASSESSMENT — ACTIVITIES OF DAILY LIVING (ADL)
GROCERY_SHOPPING: NEEDS ASSISTANCE
TAKING_MEDICATION: INDEPENDENT
DOING_HOUSEWORK: NEEDS ASSISTANCE
DRESSING: INDEPENDENT
MANAGING_FINANCES: NEEDS ASSISTANCE
BATHING: INDEPENDENT

## 2025-05-14 ASSESSMENT — ENCOUNTER SYMPTOMS
MUSCULOSKELETAL NEGATIVE: 1
GASTROINTESTINAL NEGATIVE: 1
RESPIRATORY NEGATIVE: 1
NEUROLOGICAL NEGATIVE: 1
PSYCHIATRIC NEGATIVE: 1
CARDIOVASCULAR NEGATIVE: 1

## 2025-05-14 ASSESSMENT — PATIENT HEALTH QUESTIONNAIRE - PHQ9
1. LITTLE INTEREST OR PLEASURE IN DOING THINGS: NOT AT ALL
2. FEELING DOWN, DEPRESSED OR HOPELESS: NOT AT ALL
SUM OF ALL RESPONSES TO PHQ9 QUESTIONS 1 AND 2: 0

## 2025-05-14 NOTE — PROGRESS NOTES
"Subjective   Reason for Visit: Austyn Allen is an 84 y.o. male here for a Medicare Wellness visit.          Reviewed all medications by prescribing practitioner or clinical pharmacist (such as prescriptions, OTCs, herbal therapies and supplements) and documented in the medical record.    HPI    Patient Care Team:  Chavo Contreras MD as PCP - General  Chavo Contreras MD as PCP - [facility] PCP     Review of Systems    Objective   Vitals:  /77 (BP Location: Right arm, Patient Position: Sitting, BP Cuff Size: Adult)   Pulse 57   Temp 36.7 °C (98.1 °F) (Temporal)   Resp 14   Ht 1.727 m (5' 8\")   Wt 69.2 kg (152 lb 8 oz)   SpO2 96%   BMI 23.19 kg/m²       Physical Exam    Assessment & Plan  Routine general medical examination at health care facility    Orders:    1 Year Follow Up In [facility] - PCP - Wellness Exam; Future    Comprehensive Metabolic Panel; Future    Screening for hyperlipidemia    Orders:    Lipid Panel; Future    Seizure disorder (Multi)  Continue Keppra  Follow up neurology Dr Parada   Orders:    TSH with reflex to Free T4 if abnormal; Future    Prediabetes    Orders:    TSH with reflex to Free T4 if abnormal; Future    Hemoglobin A1C; Future    Anemia, unspecified type    Orders:    CBC and Auto Differential; Future    TSH with reflex to Free T4 if abnormal; Future    Benign essential hypertension  Blood pressure in office today was   BP Readings from Last 1 Encounters:   05/14/25 162/77   The goal range for blood pressure is below 130/80.   We will continue to monitor.   Continue lisinopril 10 mg daily         Mild cognitive impairment with memory loss  Continue follow up Dr West    Orders:    sertraline (Zoloft) 25 mg tablet; Take 1 tablet (25 mg) by mouth once daily.    Mixed hyperlipidemia  Lab Results   Component Value Date    CHOL 133 04/09/2024    CHOL 127 03/30/2023    CHOL 132 03/23/2022     Lab Results   Component Value Date    HDL 62.7 04/09/2024    " HDL 60.6 03/30/2023    HDL 53.9 03/23/2022     Lab Results   Component Value Date    LDLF 56 03/30/2023     Lab Results   Component Value Date    TRIG 45 04/09/2024    TRIG 50 03/30/2023    TRIG 52 03/23/2022   Stable.  Patient only taking atorvastatin 40 mg daily, Rx is for 80 mg daily.  New Rx sent to reflect 40 mg dose.  Repeat lipid panel ordered.         Calcification of coronary artery  Continue statin for heart attack risk reduction  LDL goal <100

## 2025-05-14 NOTE — ASSESSMENT & PLAN NOTE
Continue Keppra  Follow up neurology Dr Parada   Orders:    TSH with reflex to Free T4 if abnormal; Future

## 2025-05-14 NOTE — ASSESSMENT & PLAN NOTE
Blood pressure in office today was   BP Readings from Last 1 Encounters:   05/14/25 162/77   The goal range for blood pressure is below 130/80.   We will continue to monitor.   Continue lisinopril 10 mg daily

## 2025-05-14 NOTE — ASSESSMENT & PLAN NOTE
Continue follow up Dr West    Orders:    sertraline (Zoloft) 25 mg tablet; Take 1 tablet (25 mg) by mouth once daily.

## 2025-05-20 PROBLEM — S06.5XAA SUBDURAL HEMATOMA (MULTI): Status: RESOLVED | Noted: 2024-08-31 | Resolved: 2025-05-20

## 2025-05-20 NOTE — ASSESSMENT & PLAN NOTE
Lab Results   Component Value Date    CHOL 133 04/09/2024    CHOL 127 03/30/2023    CHOL 132 03/23/2022     Lab Results   Component Value Date    HDL 62.7 04/09/2024    HDL 60.6 03/30/2023    HDL 53.9 03/23/2022     Lab Results   Component Value Date    LDLF 56 03/30/2023     Lab Results   Component Value Date    TRIG 45 04/09/2024    TRIG 50 03/30/2023    TRIG 52 03/23/2022   Stable.  Patient only taking atorvastatin 40 mg daily, Rx is for 80 mg daily.  New Rx sent to reflect 40 mg dose.  Repeat lipid panel ordered.

## 2025-05-22 LAB
ALBUMIN SERPL-MCNC: 4.1 G/DL (ref 3.6–5.1)
ALP SERPL-CCNC: 99 U/L (ref 35–144)
ALT SERPL-CCNC: 25 U/L (ref 9–46)
ANION GAP SERPL CALCULATED.4IONS-SCNC: 8 MMOL/L (CALC) (ref 7–17)
AST SERPL-CCNC: 26 U/L (ref 10–35)
BASOPHILS # BLD AUTO: 20 CELLS/UL (ref 0–200)
BASOPHILS NFR BLD AUTO: 0.3 %
BILIRUB SERPL-MCNC: 1.1 MG/DL (ref 0.2–1.2)
BUN SERPL-MCNC: 15 MG/DL (ref 7–25)
CALCIUM SERPL-MCNC: 9.4 MG/DL (ref 8.6–10.3)
CHLORIDE SERPL-SCNC: 104 MMOL/L (ref 98–110)
CHOLEST SERPL-MCNC: 115 MG/DL
CHOLEST/HDLC SERPL: 2.3 (CALC)
CO2 SERPL-SCNC: 28 MMOL/L (ref 20–32)
CREAT SERPL-MCNC: 1.02 MG/DL (ref 0.7–1.22)
EGFRCR SERPLBLD CKD-EPI 2021: 72 ML/MIN/1.73M2
EOSINOPHIL # BLD AUTO: 355 CELLS/UL (ref 15–500)
EOSINOPHIL NFR BLD AUTO: 5.3 %
ERYTHROCYTE [DISTWIDTH] IN BLOOD BY AUTOMATED COUNT: 12.5 % (ref 11–15)
EST. AVERAGE GLUCOSE BLD GHB EST-MCNC: 140 MG/DL
EST. AVERAGE GLUCOSE BLD GHB EST-SCNC: 7.7 MMOL/L
GLUCOSE SERPL-MCNC: 106 MG/DL (ref 65–99)
HBA1C MFR BLD: 6.5 %
HCT VFR BLD AUTO: 45.1 % (ref 38.5–50)
HDLC SERPL-MCNC: 49 MG/DL
HGB BLD-MCNC: 14.6 G/DL (ref 13.2–17.1)
LDLC SERPL CALC-MCNC: 52 MG/DL (CALC)
LYMPHOCYTES # BLD AUTO: 1950 CELLS/UL (ref 850–3900)
LYMPHOCYTES NFR BLD AUTO: 29.1 %
MCH RBC QN AUTO: 30.4 PG (ref 27–33)
MCHC RBC AUTO-ENTMCNC: 32.4 G/DL (ref 32–36)
MCV RBC AUTO: 93.8 FL (ref 80–100)
MONOCYTES # BLD AUTO: 570 CELLS/UL (ref 200–950)
MONOCYTES NFR BLD AUTO: 8.5 %
NEUTROPHILS # BLD AUTO: 3806 CELLS/UL (ref 1500–7800)
NEUTROPHILS NFR BLD AUTO: 56.8 %
NONHDLC SERPL-MCNC: 66 MG/DL (CALC)
PLATELET # BLD AUTO: 186 THOUSAND/UL (ref 140–400)
PMV BLD REES-ECKER: 10.1 FL (ref 7.5–12.5)
POTASSIUM SERPL-SCNC: 4.6 MMOL/L (ref 3.5–5.3)
PROT SERPL-MCNC: 6.4 G/DL (ref 6.1–8.1)
RBC # BLD AUTO: 4.81 MILLION/UL (ref 4.2–5.8)
SODIUM SERPL-SCNC: 140 MMOL/L (ref 135–146)
TRIGL SERPL-MCNC: 61 MG/DL
TSH SERPL-ACNC: 1.53 MIU/L (ref 0.4–4.5)
WBC # BLD AUTO: 6.7 THOUSAND/UL (ref 3.8–10.8)

## 2025-11-12 ENCOUNTER — APPOINTMENT (OUTPATIENT)
Dept: PRIMARY CARE | Facility: CLINIC | Age: 85
End: 2025-11-12
Payer: MEDICARE